# Patient Record
Sex: FEMALE | Race: WHITE | NOT HISPANIC OR LATINO | URBAN - METROPOLITAN AREA
[De-identification: names, ages, dates, MRNs, and addresses within clinical notes are randomized per-mention and may not be internally consistent; named-entity substitution may affect disease eponyms.]

---

## 2024-02-28 ENCOUNTER — OV NP (OUTPATIENT)
Dept: URBAN - METROPOLITAN AREA CLINIC 40 | Facility: CLINIC | Age: 59
End: 2024-02-28

## 2024-02-28 PROBLEM — 427844008: Status: ACTIVE | Noted: 2024-02-28

## 2024-02-28 NOTE — HPI-TODAY'S VISIT:
Pt alerted due to low oxygen 75% pt stated she's ok she was moving around didn't realized she dropped on her oxygen reading. No other needs at this time.   The patient is a 58 year old female with PMH as listed below, including hepatits C infection with viral load of 680,000 in 09/2022, new patient.  02/26/24: ED [facility]:  presents with complaint of rectal pain and bleeding. Pt note she has h/o rectal prolapse.. Diagnosis: Rectal prolapse vs prolapsed hemorrhoid General surgery consult: history of vulvectomy and perineal dissection for cancer 2015, who presented with strangulated rectal prolapse.  Several providers tried to reduce the prolapsed rectum without success, even with sugar.  I asked that sugar be placed on the prolapse.  An hour was given.  Rectal prolapse reduced at bedside.  Mucosa injured and will likely slough some.  Counseled patient on methods to reduce this at home as well as the necessity of loose or soft bowel movements for several days.  She will follow up with a GI referral she has received.  last colon? Dr Calhoun referral Stool sofneners   -- Patient came to window at , crying, and shaking, stating that she left her xanax at home and was having a panic attack. She was going to have her  go home and get her xanax. Advised we would be doing a rectal exam today and likely referring her for surgery. She said " so ya'll won't be doing surgery?" I stated no. Advised to go home , rest, get her medication and we will reschedule appt.

## 2024-03-04 ENCOUNTER — OV EP (OUTPATIENT)
Dept: URBAN - METROPOLITAN AREA CLINIC 40 | Facility: CLINIC | Age: 59
End: 2024-03-04
Payer: SELF-PAY

## 2024-03-04 ENCOUNTER — LAB (OUTPATIENT)
Dept: URBAN - METROPOLITAN AREA CLINIC 40 | Facility: CLINIC | Age: 59
End: 2024-03-04

## 2024-03-04 VITALS
WEIGHT: 155.4 LBS | TEMPERATURE: 97.7 F | BODY MASS INDEX: 23.55 KG/M2 | SYSTOLIC BLOOD PRESSURE: 119 MMHG | HEART RATE: 63 BPM | HEIGHT: 68 IN | DIASTOLIC BLOOD PRESSURE: 88 MMHG

## 2024-03-04 DIAGNOSIS — K62.3 RECTAL PROLAPSE: ICD-10-CM

## 2024-03-04 DIAGNOSIS — K59.09 CHRONIC CONSTIPATION: ICD-10-CM

## 2024-03-04 DIAGNOSIS — B18.2 CHRONIC HEPATITIS C WITHOUT HEPATIC COMA: ICD-10-CM

## 2024-03-04 DIAGNOSIS — Z90.79 H/O VULVECTOMY: ICD-10-CM

## 2024-03-04 PROBLEM — 128302006: Status: ACTIVE | Noted: 2024-03-04

## 2024-03-04 PROCEDURE — 99203 OFFICE O/P NEW LOW 30 MIN: CPT | Performed by: INTERNAL MEDICINE

## 2024-03-04 RX ORDER — IBUPROFEN 800 MG/1
1 TABLET WITH FOOD OR MILK AS NEEDED TABLET, FILM COATED ORAL
Status: ACTIVE | COMMUNITY

## 2024-03-04 RX ORDER — GABAPENTIN 300 MG/1
1 CAPSULE CAPSULE ORAL ONCE A DAY
Status: ACTIVE | COMMUNITY

## 2024-03-04 RX ORDER — TRAZODONE HYDROCHLORIDE 50 MG/1
1 TABLET AT BEDTIME AS NEEDED TABLET ORAL ONCE A DAY
Status: ACTIVE | COMMUNITY

## 2024-03-04 RX ORDER — ALPRAZOLAM 1 MG/1
1 TABLET TABLET ORAL TWICE A DAY
Status: ACTIVE | COMMUNITY

## 2024-03-04 NOTE — PHYSICAL EXAM CONSTITUTIONAL:
well developed, well nourished , in no acute distress , ambulating without difficulty , normal communication ability, chaperone present in room

## 2024-03-04 NOTE — PHYSICAL EXAM GASTROINTESTINAL
Abdomen , soft, nontender, nondistended , no guarding or rigidity , no masses palpable , normal bowel sounds , Liver and Spleen,  no hepatosplenomegaly , liver nontender, RECTAL: No rectal prolapse at present, prolapsed internal hemorrhoids noted, picture of rectal prolapse reviewed, severe total rectal prolapse on image.

## 2024-03-04 NOTE — HPI-TODAY'S VISIT:
The patient is a 58 year old female with PMH as listed below, including hepatitis C infection with viral load of 680,000 in 09/2022, new patient.  --02/26/24: ED [facility]: presents with complaint of rectal pain and bleeding. Pt note she has h/o rectal prolapse. Diagnosis: Rectal prolapse vs prolapsed hemorrhoid. General surgery consult: history of vulvectomy and perineal dissection for cancer 2015, who presented with strangulated rectal prolapse. Several providers tried to reduce the prolapsed rectum without success, even with sugar. I asked that sugar be placed on the prolapse. An hour was given. Rectal prolapse reduced at bedside. Mucosa injured and will likely slough some. Counseled patient on methods to reduce this at home as well as the necessity of loose or soft bowel movements for several days. She will follow up with a GI referral she has received.    --02/29/24: Patient came to window at , crying, and shaking, stating that she left her xanax at home and was having a panic attack. She was going to have her  go home and get her xanax. Advised we would be doing a rectal exam today and likely referring her for surgery. She said "so ya'll won't be doing surgery?" I stated no. Advised to go home, rest, get her medication and we will reschedule appt.   -- The patient presents today accompanied by her friend. She apologized for last encounter, stated she just had so much going on at the time. She has had warren rectal prolapse again since her ED visit, produces a picture of a complete rectal vault prolapse. She has since reduced it herself. She takes MiraLAX daily and Colace. Has stated history of what sounds like IBS-M. SHe is trying not to not eat solids, and is keeping stools loose, worried about prolapse recurring. She has to manipulate pelvis to have BM since her vulvectomy. She will press on perineum and rock back forth to have bm evacuation. She has taken Correctol, mineral oil, ex-lax in past for constipation. Her prolapse has been occurring for a few years, used to just stick out 1/2 and she would push it back in and hold it. It has gotten worse and more frequent and she is worried about it. She has not done anything about it due to lack if insurance. She is also with Hepatitis C inquiring about treatment.  Never had colonoscopy, no family history of CRC.

## 2024-03-15 ENCOUNTER — US (OUTPATIENT)
Dept: URBAN - METROPOLITAN AREA CLINIC 39 | Facility: CLINIC | Age: 59
End: 2024-03-15
Payer: SELF-PAY

## 2024-03-15 DIAGNOSIS — B18.2 CHRONIC HEPATITIS C WITHOUT HEPATIC COMA: ICD-10-CM

## 2024-03-15 PROCEDURE — 76705 ECHO EXAM OF ABDOMEN: CPT | Performed by: INTERNAL MEDICINE

## 2024-04-03 ENCOUNTER — LAB (OUTPATIENT)
Dept: URBAN - METROPOLITAN AREA CLINIC 40 | Facility: CLINIC | Age: 59
End: 2024-04-03

## 2024-04-05 ENCOUNTER — COLON (OUTPATIENT)
Dept: URBAN - METROPOLITAN AREA SURGERY CENTER 30 | Facility: SURGERY CENTER | Age: 59
End: 2024-04-05